# Patient Record
(demographics unavailable — no encounter records)

---

## 2025-01-02 NOTE — DISCUSSION/SUMMARY
[FreeTextEntry1] : POC flu negative. Supportive measures for upper respiratory infection were discussed. Such measures include use of nasal saline and suction as needed to clear the nasal passages, increasing fluids, hot showers or steam from the bathroom, propping the child up on a second pillow (for children > 1year old), use of an OTC home remedy such as vapo rub for comfort and giving 1 tablespoon of honey an hour before bedtime for cough.  Ibuprofen or acetaminophen can be used for fever or pain as per 's instructions. Return for new or worsening symptoms.

## 2025-01-02 NOTE — HISTORY OF PRESENT ILLNESS
[de-identified] : vomit x 1 day, low grade fever this am as per mom. Motrin given this am. No other c/o.  [FreeTextEntry6] : Vomited 7 x yesterday. Better today, eating and drinking well. Now has runny nose, cough, low grade fever. Making normal wet diapers.

## 2025-03-28 NOTE — PHYSICAL EXAM
[TextEntry] : General: alert and active in no apparent distress Eyes: conjunctiva clear, EOMI Ears: right ear cerumen impaction, cerumen removed with curette. TMs translucent bilaterally, normal landmarks noted, normal canals Nose: no rhinorrhea, +congestion OP: no tonsillar enlargement/exudate, no lesions, no posterior pharyngeal erythema Neck: supple, no adenopathy Lungs: clear to auscultation bilaterally, good air exchange, no retractions, comfortable WOB CVS: Normal rate, regular rhythm, no murmur Abdomen: soft, nondistended, nontender, and no hepatosplenomegaly or masses, normoactive bowel sounds Skin: No rashes, lesions or skin changes

## 2025-03-28 NOTE — HISTORY OF PRESENT ILLNESS
[de-identified] : fever starting yesterday. fever this morning, Motrin given at 7:30 am. congestion and cough x 3 days. no v/d. good appetite. drinking fluids well. normal voiding. [FreeTextEntry6] : In addition to above: no dysuria, hematuria

## 2025-03-28 NOTE — CARE PLAN
[Care Plan reviewed and provided to patient/caregiver] : Care plan reviewed and provided to patient/caregiver [Care Plan reviewed every ___ weeks] : Care plan reviewed every [unfilled] weeks [Care Plan managed/Care coordinated by: ___] : Care plan managed/Care coordinated by: [unfilled] [Understands and communicates without difficulty] : Patient/Caregiver understands and communicates without difficulty [FreeTextEntry2] : improved symptoms [FreeTextEntry3] : see plan

## 2025-03-28 NOTE — PLAN
[TextEntry] : VIRAL UPPER RESPIRATORY INFECTION:   - Discussed viral etiology and rationale for treatment - COVID/FLU negative in office today - Maintain hydration, make sure your child drinks small sips of fluids throughout the day. It's normal that they're not eating like they usually do, but hydration is key - Symptomatic treatment with acetaminophen or ibuprofen prn - Saline nose drops, cool mist humidifier - Supportive care with fluids and rest - Follow up if symptoms are worsening

## 2025-04-01 NOTE — PHYSICAL EXAM

## 2025-04-01 NOTE — PHYSICAL EXAM

## 2025-04-01 NOTE — HISTORY OF PRESENT ILLNESS
[Mother] : mother [Cow's milk (Ounces per day ___)] : consumes [unfilled] oz of Cow's milk per day [Normal] : Normal [Sippy cup use] : Sippy cup use [Brushing teeth] : Brushing teeth [Yes] : Patient goes to dentist yearly [None] : Primary Fluoride Source: None [Playtime 60 min a day] : Playtime 60 min a day [<2 hrs of screen time] : Less than 2 hrs of screen time [No] : No cigarette smoke exposure [FreeTextEntry7] : 2 yr [facility].  Patient doing well.  No parental concerns. [de-identified] : Good appetite, eats a variety of foods. [de-identified] : pt fights fluoride supplement [FreeTextEntry1] : - Coordination of care form reviewed. - Oral health risk assessment tool reviewed. - Discussed 5-2-1-0 questionnaire with parent (and patient, if age appropriate and able to comprehend.)  Concerns and issues addressed if indicated.

## 2025-04-01 NOTE — DISCUSSION/SUMMARY
[Assessment of Language Development] : assessment of language development [Temperament and Behavior] : temperament and behavior [Toilet Training] : toilet training [TV Viewing] : tv viewing [Safety] : safety [Mother] : mother [] : The components of the vaccine(s) to be administered today are listed in the plan of care. The disease(s) for which the vaccine(s) are intended to prevent and the risks have been discussed with the caretaker.  The risks are also included in the appropriate vaccination information statements which have been provided to the patient's caregiver.  The caregiver has given consent to vaccinate. [FreeTextEntry1] : - Lead and hemoglobin WNL - Follow up in 1 year for annual physical or sooner PRN.

## 2025-04-01 NOTE — DEVELOPMENTAL MILESTONES
[FreeTextEntry1] : [place] [employer]:  2-4 [place] [employer]:  2-7 [place] Psychosocial:  none [place] Language:  2-3  Gets [facility] 2x[facility]week and [facility]  2x/week [Passed] : passed

## 2025-04-01 NOTE — HISTORY OF PRESENT ILLNESS
[Mother] : mother [Cow's milk (Ounces per day ___)] : consumes [unfilled] oz of Cow's milk per day [Normal] : Normal [Sippy cup use] : Sippy cup use [Brushing teeth] : Brushing teeth [Yes] : Patient goes to dentist yearly [None] : Primary Fluoride Source: None [Playtime 60 min a day] : Playtime 60 min a day [<2 hrs of screen time] : Less than 2 hrs of screen time [No] : No cigarette smoke exposure [FreeTextEntry7] : 2 yr [facility].  Patient doing well.  No parental concerns. [de-identified] : Good appetite, eats a variety of foods. [de-identified] : pt fights fluoride supplement [FreeTextEntry1] : - Coordination of care form reviewed. - Oral health risk assessment tool reviewed. - Discussed 5-2-1-0 questionnaire with parent (and patient, if age appropriate and able to comprehend.)  Concerns and issues addressed if indicated.

## 2025-06-09 NOTE — HISTORY OF PRESENT ILLNESS
[de-identified] : fever x 1 day [FreeTextEntry6] : Tmax 100.7 cough and congestion x 2 days puling her ears Good appetite

## 2025-07-18 NOTE — PLAN
[TextEntry] :  - Treat with medication per order - Symptomatic treatment with acetaminophen or ibuprofen prn - Avoid submerging head under water x 7 days - Follow up if symptoms are worsening

## 2025-07-18 NOTE — PHYSICAL EXAM
[TextEntry] : General: alert and active in no apparent distress, crying throughout visit Eyes: conjunctiva clear, EOMI Ears: cerumen impaction right EAC, after cerumen was removed with curette, right canal erythematous, but no swelling, TMs translucent bilaterally, normal landmarks noted, normal left canal Nose: no rhinorrhea OP: no tonsillar enlargement/exudate, no lesions, no posterior pharyngeal erythema Neck: supple Lungs: clear to auscultation bilaterally, good air exchange, no retractions, comfortable WOB CVS: Normal rate, regular rhythm, no murmur Skin: No rashes, lesions or skin changes

## 2025-07-18 NOTE — HISTORY OF PRESENT ILLNESS
[de-identified] : double ear infection since last week, just finished the last dose of augmentin, pt is still holding onto ears, feels warm but no fever, stool is soft, appetite is fine, No nause/vommiting. No any other symptoms reported [FreeTextEntry6] : In addition to above: Was on vacation in [place] Seen at urgent care there, diagnosed with BL AOM, RX'd Augmentin, which patient completed yesterday Here today because  said she has been holding her ears and crying Has been swimming in pool Has fevers when first Dx'd with AOM but no longer Denies URI symptoms, V/D/abd pain Appetite and UOP at baseline